# Patient Record
Sex: FEMALE | Race: WHITE | Employment: OTHER | URBAN - NONMETROPOLITAN AREA
[De-identification: names, ages, dates, MRNs, and addresses within clinical notes are randomized per-mention and may not be internally consistent; named-entity substitution may affect disease eponyms.]

---

## 2023-10-18 NOTE — PROGRESS NOTES
Dr Sonali Easton. 79 yo with RLL pneumonia, several weeks of weakness, cough, no energy, SOB. Negative covid and flu. Rocephin and levofloxacin given. No home O2, requiring O2 2L. CLARENCE, dehydration, bun creat 46/4.7. 1L fluids given. Troponin 40, K+ wnl, wbc 10, EKG sinus.

## 2023-10-19 ENCOUNTER — HOSPITAL ENCOUNTER (INPATIENT)
Age: 83
LOS: 5 days | Discharge: HOME | DRG: 682 | End: 2023-10-24
Attending: STUDENT IN AN ORGANIZED HEALTH CARE EDUCATION/TRAINING PROGRAM | Admitting: INTERNAL MEDICINE
Payer: MEDICARE

## 2023-10-19 ENCOUNTER — APPOINTMENT (OUTPATIENT)
Dept: GENERAL RADIOLOGY | Age: 83
DRG: 682 | End: 2023-10-19
Attending: STUDENT IN AN ORGANIZED HEALTH CARE EDUCATION/TRAINING PROGRAM
Payer: MEDICARE

## 2023-10-19 DIAGNOSIS — E87.6 HYPOKALEMIA: ICD-10-CM

## 2023-10-19 DIAGNOSIS — N17.9 AKI (ACUTE KIDNEY INJURY) (HCC): Primary | ICD-10-CM

## 2023-10-19 PROBLEM — J96.90 RESPIRATORY FAILURE (HCC): Status: ACTIVE | Noted: 2023-10-19

## 2023-10-19 LAB
ALBUMIN SERPL BCG-MCNC: 3.6 G/DL (ref 3.5–5.1)
ALP SERPL-CCNC: 234 U/L (ref 38–126)
ALT SERPL W/O P-5'-P-CCNC: 41 U/L (ref 11–66)
ANION GAP SERPL CALC-SCNC: 14 MEQ/L (ref 8–16)
AST SERPL-CCNC: 43 U/L (ref 5–40)
BILIRUB SERPL-MCNC: 0.4 MG/DL (ref 0.3–1.2)
BUN SERPL-MCNC: 48 MG/DL (ref 7–22)
CALCIUM SERPL-MCNC: 9.5 MG/DL (ref 8.5–10.5)
CHLORIDE SERPL-SCNC: 102 MEQ/L (ref 98–111)
CO2 SERPL-SCNC: 24 MEQ/L (ref 23–33)
CREAT SERPL-MCNC: 4.2 MG/DL (ref 0.4–1.2)
GFR SERPL CREATININE-BSD FRML MDRD: 10 ML/MIN/1.73M2
GLUCOSE SERPL-MCNC: 138 MG/DL (ref 70–108)
LACTATE SERPL-SCNC: 1 MMOL/L (ref 0.5–2)
POTASSIUM SERPL-SCNC: 3.4 MEQ/L (ref 3.5–5.2)
PROT SERPL-MCNC: 7.2 G/DL (ref 6.1–8)
SODIUM SERPL-SCNC: 140 MEQ/L (ref 135–145)

## 2023-10-19 PROCEDURE — 82550 ASSAY OF CK (CPK): CPT

## 2023-10-19 PROCEDURE — 80053 COMPREHEN METABOLIC PANEL: CPT

## 2023-10-19 PROCEDURE — 1200000003 HC TELEMETRY R&B

## 2023-10-19 PROCEDURE — 84550 ASSAY OF BLOOD/URIC ACID: CPT

## 2023-10-19 PROCEDURE — 51701 INSERT BLADDER CATHETER: CPT

## 2023-10-19 PROCEDURE — 51798 US URINE CAPACITY MEASURE: CPT

## 2023-10-19 PROCEDURE — 2580000003 HC RX 258: Performed by: PHYSICIAN ASSISTANT

## 2023-10-19 PROCEDURE — 36415 COLL VENOUS BLD VENIPUNCTURE: CPT

## 2023-10-19 PROCEDURE — 71045 X-RAY EXAM CHEST 1 VIEW: CPT

## 2023-10-19 PROCEDURE — 83735 ASSAY OF MAGNESIUM: CPT

## 2023-10-19 PROCEDURE — 83605 ASSAY OF LACTIC ACID: CPT

## 2023-10-19 PROCEDURE — 99222 1ST HOSP IP/OBS MODERATE 55: CPT | Performed by: PHYSICIAN ASSISTANT

## 2023-10-19 RX ORDER — LOSARTAN POTASSIUM 25 MG/1
12.5 TABLET ORAL DAILY
Status: DISCONTINUED | OUTPATIENT
Start: 2023-10-20 | End: 2023-10-24 | Stop reason: HOSPADM

## 2023-10-19 RX ORDER — ACETAMINOPHEN 325 MG/1
650 TABLET ORAL EVERY 6 HOURS PRN
Status: DISCONTINUED | OUTPATIENT
Start: 2023-10-19 | End: 2023-10-24 | Stop reason: HOSPADM

## 2023-10-19 RX ORDER — ACETAMINOPHEN 650 MG/1
650 SUPPOSITORY RECTAL EVERY 6 HOURS PRN
Status: DISCONTINUED | OUTPATIENT
Start: 2023-10-19 | End: 2023-10-24 | Stop reason: HOSPADM

## 2023-10-19 RX ORDER — SODIUM CHLORIDE 0.9 % (FLUSH) 0.9 %
10 SYRINGE (ML) INJECTION EVERY 12 HOURS SCHEDULED
Status: DISCONTINUED | OUTPATIENT
Start: 2023-10-19 | End: 2023-10-24 | Stop reason: HOSPADM

## 2023-10-19 RX ORDER — ONDANSETRON 2 MG/ML
4 INJECTION INTRAMUSCULAR; INTRAVENOUS EVERY 6 HOURS PRN
Status: DISCONTINUED | OUTPATIENT
Start: 2023-10-19 | End: 2023-10-24 | Stop reason: HOSPADM

## 2023-10-19 RX ORDER — AMLODIPINE BESYLATE 2.5 MG/1
2.5 TABLET ORAL DAILY
Status: DISCONTINUED | OUTPATIENT
Start: 2023-10-20 | End: 2023-10-24 | Stop reason: HOSPADM

## 2023-10-19 RX ORDER — SODIUM CHLORIDE 0.9 % (FLUSH) 0.9 %
10 SYRINGE (ML) INJECTION PRN
Status: DISCONTINUED | OUTPATIENT
Start: 2023-10-19 | End: 2023-10-24 | Stop reason: HOSPADM

## 2023-10-19 RX ORDER — ENOXAPARIN SODIUM 100 MG/ML
30 INJECTION SUBCUTANEOUS DAILY
Status: DISCONTINUED | OUTPATIENT
Start: 2023-10-20 | End: 2023-10-20

## 2023-10-19 RX ORDER — ONDANSETRON 4 MG/1
4 TABLET, ORALLY DISINTEGRATING ORAL EVERY 8 HOURS PRN
Status: DISCONTINUED | OUTPATIENT
Start: 2023-10-19 | End: 2023-10-24 | Stop reason: HOSPADM

## 2023-10-19 RX ORDER — ENOXAPARIN SODIUM 100 MG/ML
40 INJECTION SUBCUTANEOUS DAILY
Status: DISCONTINUED | OUTPATIENT
Start: 2023-10-20 | End: 2023-10-19

## 2023-10-19 RX ORDER — SODIUM CHLORIDE, SODIUM LACTATE, POTASSIUM CHLORIDE, AND CALCIUM CHLORIDE .6; .31; .03; .02 G/100ML; G/100ML; G/100ML; G/100ML
1000 INJECTION, SOLUTION INTRAVENOUS ONCE
Status: COMPLETED | OUTPATIENT
Start: 2023-10-19 | End: 2023-10-20

## 2023-10-19 RX ORDER — ASPIRIN 81 MG/1
81 TABLET, CHEWABLE ORAL DAILY
Status: DISCONTINUED | OUTPATIENT
Start: 2023-10-20 | End: 2023-10-24 | Stop reason: HOSPADM

## 2023-10-19 RX ORDER — SODIUM CHLORIDE, SODIUM LACTATE, POTASSIUM CHLORIDE, CALCIUM CHLORIDE 600; 310; 30; 20 MG/100ML; MG/100ML; MG/100ML; MG/100ML
INJECTION, SOLUTION INTRAVENOUS CONTINUOUS
Status: DISCONTINUED | OUTPATIENT
Start: 2023-10-19 | End: 2023-10-24

## 2023-10-19 RX ORDER — SODIUM CHLORIDE 9 MG/ML
INJECTION, SOLUTION INTRAVENOUS PRN
Status: DISCONTINUED | OUTPATIENT
Start: 2023-10-19 | End: 2023-10-24 | Stop reason: HOSPADM

## 2023-10-19 RX ORDER — POLYETHYLENE GLYCOL 3350 17 G/17G
17 POWDER, FOR SOLUTION ORAL DAILY PRN
Status: DISCONTINUED | OUTPATIENT
Start: 2023-10-19 | End: 2023-10-24 | Stop reason: HOSPADM

## 2023-10-19 RX ADMIN — SODIUM CHLORIDE, POTASSIUM CHLORIDE, SODIUM LACTATE AND CALCIUM CHLORIDE 1000 ML: 600; 310; 30; 20 INJECTION, SOLUTION INTRAVENOUS at 22:45

## 2023-10-19 RX ADMIN — SODIUM CHLORIDE, POTASSIUM CHLORIDE, SODIUM LACTATE AND CALCIUM CHLORIDE: 600; 310; 30; 20 INJECTION, SOLUTION INTRAVENOUS at 22:02

## 2023-10-20 ENCOUNTER — APPOINTMENT (OUTPATIENT)
Dept: ULTRASOUND IMAGING | Age: 83
DRG: 682 | End: 2023-10-20
Attending: STUDENT IN AN ORGANIZED HEALTH CARE EDUCATION/TRAINING PROGRAM
Payer: MEDICARE

## 2023-10-20 ENCOUNTER — APPOINTMENT (OUTPATIENT)
Dept: CT IMAGING | Age: 83
DRG: 682 | End: 2023-10-20
Attending: STUDENT IN AN ORGANIZED HEALTH CARE EDUCATION/TRAINING PROGRAM
Payer: MEDICARE

## 2023-10-20 LAB
ANION GAP SERPL CALC-SCNC: 14 MEQ/L (ref 8–16)
BACTERIA: NORMAL
BASOPHILS ABSOLUTE: 0.1 THOU/MM3 (ref 0–0.1)
BASOPHILS NFR BLD AUTO: 0.5 %
BILIRUB UR QL STRIP: NEGATIVE
BUN SERPL-MCNC: 42 MG/DL (ref 7–22)
CALCIUM SERPL-MCNC: 9.2 MG/DL (ref 8.5–10.5)
CASTS #/AREA URNS LPF: NORMAL /LPF
CASTS #/AREA URNS LPF: NORMAL /LPF
CHARACTER UR: CLEAR
CHARCOAL URNS QL MICRO: NORMAL
CHLORIDE SERPL-SCNC: 104 MEQ/L (ref 98–111)
CK SERPL-CCNC: 67 U/L (ref 30–135)
CO2 SERPL-SCNC: 24 MEQ/L (ref 23–33)
COLOR UR: YELLOW
CREAT SERPL-MCNC: 3.9 MG/DL (ref 0.4–1.2)
CRYSTALS URNS QL MICRO: NORMAL
DEPRECATED RDW RBC AUTO: 43.1 FL (ref 35–45)
EOSINOPHIL NFR BLD AUTO: 1.8 %
EOSINOPHILS ABSOLUTE: 0.2 THOU/MM3 (ref 0–0.4)
EPITHELIAL CELLS, UA: NORMAL /HPF
ERYTHROCYTE [DISTWIDTH] IN BLOOD BY AUTOMATED COUNT: 12.6 % (ref 11.5–14.5)
GFR SERPL CREATININE-BSD FRML MDRD: 11 ML/MIN/1.73M2
GLUCOSE SERPL-MCNC: 146 MG/DL (ref 70–108)
GLUCOSE UR QL STRIP.AUTO: NEGATIVE MG/DL
HCT VFR BLD AUTO: 30.8 % (ref 37–47)
HGB BLD-MCNC: 9.9 GM/DL (ref 12–16)
HGB UR QL STRIP.AUTO: NEGATIVE
IMM GRANULOCYTES # BLD AUTO: 0.05 THOU/MM3 (ref 0–0.07)
IMM GRANULOCYTES NFR BLD AUTO: 0.5 %
KETONES UR QL STRIP.AUTO: NEGATIVE
LEUKOCYTE ESTERASE UR QL STRIP.AUTO: NEGATIVE
LYMPHOCYTES ABSOLUTE: 1.4 THOU/MM3 (ref 1–4.8)
LYMPHOCYTES NFR BLD AUTO: 13.9 %
MAGNESIUM SERPL-MCNC: 1.7 MG/DL (ref 1.6–2.4)
MCH RBC QN AUTO: 29.6 PG (ref 26–33)
MCHC RBC AUTO-ENTMCNC: 32.1 GM/DL (ref 32.2–35.5)
MCV RBC AUTO: 92.2 FL (ref 81–99)
MONOCYTES ABSOLUTE: 0.7 THOU/MM3 (ref 0.4–1.3)
MONOCYTES NFR BLD AUTO: 7.3 %
NEUTROPHILS NFR BLD AUTO: 76 %
NITRITE UR QL STRIP.AUTO: NEGATIVE
NRBC BLD AUTO-RTO: 0 /100 WBC
PH UR STRIP.AUTO: 5 [PH] (ref 5–9)
PLATELET # BLD AUTO: 407 THOU/MM3 (ref 130–400)
PMV BLD AUTO: 9.2 FL (ref 9.4–12.4)
POTASSIUM SERPL-SCNC: 3.9 MEQ/L (ref 3.5–5.2)
PROCALCITONIN SERPL IA-MCNC: 0.23 NG/ML (ref 0.01–0.09)
PROT UR STRIP.AUTO-MCNC: NEGATIVE MG/DL
RBC # BLD AUTO: 3.34 MILL/MM3 (ref 4.2–5.4)
RBC #/AREA URNS HPF: NORMAL /HPF
RENAL EPI CELLS #/AREA URNS HPF: NORMAL /[HPF]
SEGMENTED NEUTROPHILS ABSOLUTE COUNT: 7.6 THOU/MM3 (ref 1.8–7.7)
SODIUM SERPL-SCNC: 142 MEQ/L (ref 135–145)
SPECIFIC GRAVITY UA: 1.01 (ref 1–1.03)
URATE SERPL-MCNC: 8.4 MG/DL (ref 2.4–5.7)
UROBILINOGEN, URINE: 0.2 EU/DL (ref 0–1)
WBC # BLD AUTO: 10 THOU/MM3 (ref 4.8–10.8)
WBC #/AREA URNS HPF: NORMAL /HPF
YEAST LIKE FUNGI URNS QL MICRO: NORMAL

## 2023-10-20 PROCEDURE — 84145 PROCALCITONIN (PCT): CPT

## 2023-10-20 PROCEDURE — 6370000000 HC RX 637 (ALT 250 FOR IP): Performed by: PHYSICIAN ASSISTANT

## 2023-10-20 PROCEDURE — 81001 URINALYSIS AUTO W/SCOPE: CPT

## 2023-10-20 PROCEDURE — 85025 COMPLETE CBC W/AUTO DIFF WBC: CPT

## 2023-10-20 PROCEDURE — 6360000002 HC RX W HCPCS: Performed by: PHYSICIAN ASSISTANT

## 2023-10-20 PROCEDURE — 1200000003 HC TELEMETRY R&B

## 2023-10-20 PROCEDURE — 6360000002 HC RX W HCPCS: Performed by: NURSE PRACTITIONER

## 2023-10-20 PROCEDURE — 2580000003 HC RX 258: Performed by: NURSE PRACTITIONER

## 2023-10-20 PROCEDURE — 76770 US EXAM ABDO BACK WALL COMP: CPT

## 2023-10-20 PROCEDURE — 99233 SBSQ HOSP IP/OBS HIGH 50: CPT | Performed by: NURSE PRACTITIONER

## 2023-10-20 PROCEDURE — 80048 BASIC METABOLIC PNL TOTAL CA: CPT

## 2023-10-20 PROCEDURE — 94669 MECHANICAL CHEST WALL OSCILL: CPT

## 2023-10-20 PROCEDURE — 71250 CT THORAX DX C-: CPT

## 2023-10-20 PROCEDURE — 86160 COMPLEMENT ANTIGEN: CPT

## 2023-10-20 PROCEDURE — 99222 1ST HOSP IP/OBS MODERATE 55: CPT | Performed by: INTERNAL MEDICINE

## 2023-10-20 PROCEDURE — 82610 CYSTATIN C: CPT

## 2023-10-20 PROCEDURE — 2580000003 HC RX 258: Performed by: INTERNAL MEDICINE

## 2023-10-20 PROCEDURE — 36415 COLL VENOUS BLD VENIPUNCTURE: CPT

## 2023-10-20 RX ORDER — HEPARIN SODIUM 5000 [USP'U]/ML
5000 INJECTION, SOLUTION INTRAVENOUS; SUBCUTANEOUS EVERY 8 HOURS SCHEDULED
Status: DISCONTINUED | OUTPATIENT
Start: 2023-10-20 | End: 2023-10-24 | Stop reason: HOSPADM

## 2023-10-20 RX ADMIN — HEPARIN SODIUM 5000 UNITS: 5000 INJECTION INTRAVENOUS; SUBCUTANEOUS at 01:21

## 2023-10-20 RX ADMIN — CEFTRIAXONE SODIUM 1000 MG: 1 INJECTION, POWDER, FOR SOLUTION INTRAMUSCULAR; INTRAVENOUS at 10:50

## 2023-10-20 RX ADMIN — HEPARIN SODIUM 5000 UNITS: 5000 INJECTION INTRAVENOUS; SUBCUTANEOUS at 06:16

## 2023-10-20 RX ADMIN — HEPARIN SODIUM 5000 UNITS: 5000 INJECTION INTRAVENOUS; SUBCUTANEOUS at 15:35

## 2023-10-20 RX ADMIN — HEPARIN SODIUM 5000 UNITS: 5000 INJECTION INTRAVENOUS; SUBCUTANEOUS at 22:19

## 2023-10-20 RX ADMIN — AMLODIPINE BESYLATE 2.5 MG: 2.5 TABLET ORAL at 10:16

## 2023-10-20 RX ADMIN — ASPIRIN 81 MG CHEWABLE TABLET 81 MG: 81 TABLET CHEWABLE at 10:16

## 2023-10-20 RX ADMIN — AZITHROMYCIN MONOHYDRATE 250 MG: 500 INJECTION, POWDER, LYOPHILIZED, FOR SOLUTION INTRAVENOUS at 10:53

## 2023-10-20 RX ADMIN — ONDANSETRON 4 MG: 4 TABLET, ORALLY DISINTEGRATING ORAL at 10:16

## 2023-10-20 RX ADMIN — SODIUM CHLORIDE, POTASSIUM CHLORIDE, SODIUM LACTATE AND CALCIUM CHLORIDE: 600; 310; 30; 20 INJECTION, SOLUTION INTRAVENOUS at 17:32

## 2023-10-20 NOTE — CARE COORDINATION
Case Management Assessment  Initial Evaluation    Date/Time of Evaluation: 10/20/2023 12:17 PM  Assessment Completed by: Shoshana Moura RN    If patient is discharged prior to next notation, then this note serves as note for discharge by case management. Patient Name: Carlos Díaz                   YOB: 1940  Diagnosis: Respiratory failure [facility]) [J96.90]                   Date / Time: 10/19/2023  9:15 PM  Location: [address] [place] [address]     Patient Admission Status: Inpatient   Readmission Risk Low 0-14, Mod 15-19), High > 20: Readmission Risk Score: 12.7    Current PCP: Shiva Stewart MD  PCP verified by CM? Yes    Chart Reviewed: Yes      History Provided by: Patient  Patient Orientation: Alert and Oriented    Patient Cognition: Alert    Hospitalization in the last 30 days (Readmission):  No    If yes, Readmission Assessment in CM Navigator will be completed. Advance Directives:      Code Status: Full Code   Patient's Primary Decision Maker is: Legal Next of Kin      Discharge Planning:    Patient lives with: Alone Type of Home: Independent Living  Primary Care Giver: Self  Patient Support Systems include: Children   Current Financial resources: Medicare  Current community resources: None  Current services prior to admission: None            Current DME:              Type of Home Care services:  None    ADLS  Prior functional level: Independent in ADLs/IADLs  Current functional level: Independent in ADLs/IADLs    Family can provide assistance at DC: Yes  Would you like Case Management to discuss the discharge plan with any other family members/significant others, and if so, who? No  Plans to Return to Present Housing: Yes  Other Identified Issues/Barriers to RETURNING to current housing: No  Potential Assistance needed at discharge: N/A            Potential DME:    Patient expects to discharge to:  Independent living facility  Plan for transportation at discharge: Family    Financial    Payor:

## 2023-10-20 NOTE — H&P
Hospitalist History and Physical          Patient: Nalini Kaye  : 1940  MRN: [id number]     Acct: [de-identified]    PCP: Marsha Marquis  Date of Admission: 10/19/2023  Date of Service: Pt seen/examined on 10/19/23  and Admitted to Inpatient with expected LOS less than two midnights due to medical therapy. Assessment and Plan:    Stage III CLARENCE on CKD   Creatinine 4.2 - was 0.6 in   Strict I and O's - help determine if oliguric   Bladder scan showed 400 mL after arrival  BUN:creatinine of 11:1   Nephrology consult   Check CK, C3&C4, uric acid and cystatin C levels   Holding ACEi and avoid nephrotoxic agents    Urinary retention  Endorses difficulty and pressure in bladder  Found to have 400+ on bladder and was straight cath prior to arrival  Espino catheter ordered  CT abd/pelvis ordered to evaluate for hydronephrosis and any signs of obstructive uropathy       Primary hypertension  Hold ACEi, resume amlodipine       Hypokalemia   3.3 on arrival  Deferring initial replacement due to kidney dysfunction and danger of potassium retention   Monitor with repeat labs    Hx of PE  Noted in hx - heparin DVT/PE prophylaxis     =======================================================================      Chief Complaint:  fatigue    History Of Present Illness:  Nlaini Kaye is a 80 y.o. female with Pwho presents to [address] [facility] [address] with fatigue, URI symptoms and transient hypoxia from [facility]. Patient has been experiencing fatigue for the past two weeks. She states in the past two weeks she has lost ten pounds and has not eaten or drank much. She had been having productive cough but has appeared to resolve. She was found to have a very elevated creatinine which had prompted her to be transferred to [facility]. On assessment she appeared well and endorsed feeling well.  She does endorse having trouble with urination despite feeling like she has to go and has had some ongoing urinary \"TROPONINI\" in the last 72 hours. No results found for: \"NITRU\", \"WBCUA\", \"BACTERIA\", \"RBCUA\", \"BLOODU\", \"SPECGRAV\", \"GLUCOSEU\"      Radiology:     No orders to display                  Diet: No diet orders on file  DVT prophylaxis: heparin  Code Status: Prior  Disposition: admit to 8B     Thank you Kamari Zhang for the opportunity to be involved in this patient's care.     Electronically signed by MARCIAL Leiva on 10/19/2023 at 9:25 PM.

## 2023-10-20 NOTE — PROGRESS NOTES
Comprehensive Nutrition Assessment    Type and Reason for Visit:  Initial, Positive Nutrition Screen (Weight loss and decreased appetite)    Nutrition Recommendations/Plan:   Continue regular diet to encourage PO intake at best efforts  Start ONS: Ensure Plus - BID, strawberry flavor  Will monitor need for additional nutrition interventions. Malnutrition Assessment:  Malnutrition Status: At risk for malnutrition (Comment) (10/20/23 1050)    Context:  Acute Illness     Findings of the 6 clinical characteristics of malnutrition:  Energy Intake:  75% or less of estimated energy requirements for 7 or more days  Weight Loss:  No significant weight loss     Body Fat Loss:  No significant body fat loss     Muscle Mass Loss:  No significant muscle mass loss    Fluid Accumulation:  No significant fluid accumulation     Strength:  Not Performed    Nutrition Assessment:     Pt. nutritionally compromised AEB poor appetite and PO intake PTA for about 3-4 weeks. At risk for further nutrition compromise r/t CLARENCE, advanced age and underlying medical condition (PMHx: HTN, pulmonary emboli, s/p cholecystectomy, C- section, laminotomy 2015). Nutrition Related Findings:    Pt. Report/Treatments/Miscellaneous: Pt seen, not wanting to eat much of breakfast, RN going to make her toast to eat as breakfast tray was \"too heavy\" food wise for her. Pt states that she hasn't had much of an appetite for 3-4 weeks now and reports a UBW of 152# - no significant wt changes. Pt states she is just not hungry and cannot give a reasoning behind changes. Pt says she feels \"fine. \" Discussed PO intake and ONS use - pt agreed to ensure strawberry flavor BID.    GI Status: BM -10/19  Pertinent Labs: BUN 42, Creatinine 3.9, Glucose 146  Pertinent Meds: azithromycin, heparin, lactated ringers     Wound Type: None       Current Nutrition Intake & Therapies:    Average Meal Intake: Unable to assess (No PO intakes documented, pt not wanting

## 2023-10-20 NOTE — PROGRESS NOTES
Hospitalist Progress Note    Patient:  Caprice Flores      Unit/Bed:8B-33/033-A    YOB: 1940    MRN: [id number]       Acct: [de-identified]     PCP: Dev Slater    Date of Admission: 10/19/2023    Assessment/Plan:    Emilee Cote secondary to #2, #3; patient lives at independent living at [facility]  CLARENCE on CKD stage V--Cozaar on hold; nephrology has been consulted; renal ultrasound has been ordered; avoid nephrotoxic agents, will have pharmacy dose all medications for GFR; currently on lactated Ringer's at 75 mL an hour  Pneumonia, right base (POA) possibly secondary to gram-negative bacilli--checked procalcitonin reading of 0.23, check CT chest without contrast to evaluate further; afebrile; will add Rocephin 10/20, Zithromax 10/20; add incentive spirometry and Acapella  Urinary retention--urinalysis clear  Primary hypertension, uncontrolled--on Norvasc, Cozaar on hold with #2; monitor  Hypokalemia--resolved with replacement, monitor closely  Elevated alkaline phosphatase  History of PE in 2012--not on any home anticoagulation      Expected discharge date: Pending clinical course    Disposition:    [x] Home       [] TCU       [] Rehab       [] Psych       [] SNF       [] [address]       [] Other-    Chief Complaint: Weakness, fatigue    Hospital Course: Per H&P done 10/19/2023: Caprice Flores is a 80 y.o. female with Pwho presents to [facility] with fatigue, URI symptoms and transient hypoxia from [facility]. Patient has been experiencing fatigue for the past two weeks. She states in the past two weeks she has lost ten pounds and has not eaten or drank much. She had been having productive cough but has appeared to resolve. She was found to have a very elevated creatinine which had prompted her to be transferred to [facility]. On assessment she appeared well and endorsed feeling well.  She does endorse having trouble with urination despite feeling like auscultation except diminished in the right base, bilaterally without Rales/Wheezes/Rhonchi. Cardiovascular: Regular rate and rhythm with normal S1/S2 without murmurs, rubs or gallops. Abdomen: Soft, non-tender, non-distended with normal bowel sounds. Musculoskeletal: passive and active ROM x 4 extremities. Skin: Skin color, texture, turgor normal.    Neurologic:  Neurovascularly intact without any focal sensory/motor deficits. Cranial nerves: II-XII intact, grossly non-focal.  Psychiatric: Alert and oriented, thought content appropriate  Capillary Refill: Brisk,< 3 seconds   Peripheral Pulses: +2 palpable, equal bilaterally       Labs:   No results for input(s): \"WBC\", \"HGB\", \"HCT\", \"PLT\" in the last 72 hours. Recent Labs     10/19/23  2145 10/20/23  0618    142   K 3.4* 3.9    104   CO2 24 24   BUN 48* 42*   CREATININE 4.2* 3.9*   CALCIUM 9.5 9.2     Recent Labs     10/19/23  2145   AST 43*   ALT 41   BILITOT 0.4   ALKPHOS 234*       Recent Labs     10/19/23  2145   CKTOTAL 67       Microbiology:    None    Urinalysis:      Lab Results   Component Value Date/Time    NITRU NEGATIVE 10/20/2023 12:50 AM    WBCUA 0-2 10/20/2023 12:50 AM    BACTERIA NONE SEEN 10/20/2023 12:50 AM    RBCUA NONE 10/20/2023 12:50 AM    BLOODU NEGATIVE 10/20/2023 12:50 AM    SPECGRAV 1.008 10/20/2023 12:50 AM       Radiology:  XR CHEST PORTABLE    Result Date: 10/20/2023  CHEST X-RAY FRONTAL VIEW COMPARISON: None. FINDINGS: A single frontal view of the chest was performed. Dextrocurvature of the thoracic spine is noted. The cardiac silhouette is accentuated by the portable technique. There is calcification within the thoracic aortic arch. A hazy right basilar infiltrate is suspected. No pleural effusion or pneumothorax. 1. A hazy right basilar infiltrate is suspected. CT chest can be considered for a more accurate assessment.  This document has been electronically signed by: Maria Fernanda Garrett M.D. on 10/20/2023 12:06

## 2023-10-20 NOTE — PROGRESS NOTES
Pharmacy Consult    Eugenia Cross is a 80 y.o. female for whom pharmacy has been consulted to renally dose all medications for GFR. 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 Assessment/Plan:  Inpatient medications:  No medications require renal adjustments at this time   Recommend keeping losartan on hold    Outpatient medications  Norco 7.5mg q6h --> recommend change to 5mg q6h upon discharge  Oxycodone 10mg q6h --> recommend change to 5mg q8h upon discharge  Patient had recent fill history (90 day supply on 8/17/23) for several medications that was not on their home med list. The recommendations for those medications are listed below:  Atorvastatin 40mg daily - no change  Levothyroxine 25mcg - no change  Metformin 1000mg BID - use is contraindicated  Losartan 50/HCTZ 12.5mg - HCTZ use is not recommended due to lack of efficacy    Thank you for the consult. Will continue to follow.     Teri Prader, PharmD   Pharmacy Resident  10/20/2023 10:49 AM

## 2023-10-20 NOTE — CONSULTS
[facility]    [address] [facility], [address] [place] [address],[address]  [address] [facility] [address]  10/20/2023 11:25 AM    Pt Name:    Raymundo Rea  MRN:     [id number]   [id number]  YOB: 1940  Admit Date:    10/19/2023  9:15 PM  Primary Care Physician:  Lenin Stone        Reason for Consult:  CLARENCE    History: The patient is a 80 y.o. with PMH of diabetes, HTN, who presented to the ED after 3 weeks of lethargy and malaise. She says that she began feeling tired about 3 weeks ago and has spent most of her time laying in bed. She has had poor oral intake. She also reports a productive cough with green sputum that started around the same time. On arrival at the ED her creatinine was 4.7. Her baseline creatinine was 0.9 in 2023. The patient also reports a history of chronic diarrhea and says that she has not told her doctor about it. She did not appear to be in any acute distress when I spoke to her but was hesitant to eat her breakfast.    Past Medical History:  Past Medical History:   Diagnosis Date    Herniated cervical disc without myelopathy 2015    Left cervical 6-7 disc herniation with left C6-7 neuroforaminal and lateral recess stenosis; with radiculopathy on left; without myelopathy. Hypertension     Pulmonary emboli ([address])        Past Surgical History:  Past Surgical History:   Procedure Laterality Date     SECTION      CHOLECYSTECTOMY      LAMINOTOMY  2015    posterior cervical laminotomy C6/7, formaintomy and microdiskectomy       Family History:  Family History   Problem Relation Age of Onset    Cancer Mother         brain tumor    High Blood Pressure Mother     Heart Disease Father         MI    Stroke Father     Heart Disease Paternal Grandmother         MI    Diabetes Neg Hx        Social History:  Social History     Socioeconomic History    Marital status:       Spouse name: Not on file    Number of children: Not on file    Years

## 2023-10-21 ENCOUNTER — APPOINTMENT (OUTPATIENT)
Dept: GENERAL RADIOLOGY | Age: 83
DRG: 682 | End: 2023-10-21
Attending: STUDENT IN AN ORGANIZED HEALTH CARE EDUCATION/TRAINING PROGRAM
Payer: MEDICARE

## 2023-10-21 LAB
ANION GAP SERPL CALC-SCNC: 13 MEQ/L (ref 8–16)
BUN SERPL-MCNC: 38 MG/DL (ref 7–22)
C3C SERPL-MCNC: 208 MG/DL (ref 90–180)
C4 SERPL-MCNC: 13 MG/DL (ref 10–40)
CALCIUM SERPL-MCNC: 9 MG/DL (ref 8.5–10.5)
CHLORIDE SERPL-SCNC: 105 MEQ/L (ref 98–111)
CO2 SERPL-SCNC: 25 MEQ/L (ref 23–33)
CREAT SERPL-MCNC: 2.9 MG/DL (ref 0.4–1.2)
DEPRECATED RDW RBC AUTO: 43.5 FL (ref 35–45)
ERYTHROCYTE [DISTWIDTH] IN BLOOD BY AUTOMATED COUNT: 12.8 % (ref 11.5–14.5)
GFR SERPL CREATININE-BSD FRML MDRD: 16 ML/MIN/1.73M2
GLUCOSE BLD STRIP.AUTO-MCNC: 154 MG/DL (ref 70–108)
GLUCOSE BLD STRIP.AUTO-MCNC: 196 MG/DL (ref 70–108)
GLUCOSE BLD STRIP.AUTO-MCNC: 237 MG/DL (ref 70–108)
GLUCOSE SERPL-MCNC: 124 MG/DL (ref 70–108)
HCT VFR BLD AUTO: 30.1 % (ref 37–47)
HGB BLD-MCNC: 9.5 GM/DL (ref 12–16)
MAGNESIUM SERPL-MCNC: 1.4 MG/DL (ref 1.6–2.4)
MCH RBC QN AUTO: 37.4 PG (ref 26–33)
MCHC RBC AUTO-ENTMCNC: 37.8 GM/DL (ref 32.2–35.5)
MCV RBC AUTO: 98.8 FL (ref 81–99)
PLATELET # BLD AUTO: 343 THOU/MM3 (ref 130–400)
PMV BLD AUTO: 8.9 FL (ref 9.4–12.4)
POTASSIUM SERPL-SCNC: 3.4 MEQ/L (ref 3.5–5.2)
RBC # BLD AUTO: 2.54 MILL/MM3 (ref 4.2–5.4)
SODIUM SERPL-SCNC: 143 MEQ/L (ref 135–145)
WBC # BLD AUTO: 7.2 THOU/MM3 (ref 4.8–10.8)

## 2023-10-21 PROCEDURE — 6370000000 HC RX 637 (ALT 250 FOR IP): Performed by: INTERNAL MEDICINE

## 2023-10-21 PROCEDURE — 36415 COLL VENOUS BLD VENIPUNCTURE: CPT

## 2023-10-21 PROCEDURE — 6370000000 HC RX 637 (ALT 250 FOR IP): Performed by: PHYSICIAN ASSISTANT

## 2023-10-21 PROCEDURE — 99232 SBSQ HOSP IP/OBS MODERATE 35: CPT | Performed by: INTERNAL MEDICINE

## 2023-10-21 PROCEDURE — 82948 REAGENT STRIP/BLOOD GLUCOSE: CPT

## 2023-10-21 PROCEDURE — 6360000002 HC RX W HCPCS: Performed by: NURSE PRACTITIONER

## 2023-10-21 PROCEDURE — 94669 MECHANICAL CHEST WALL OSCILL: CPT

## 2023-10-21 PROCEDURE — 85027 COMPLETE CBC AUTOMATED: CPT

## 2023-10-21 PROCEDURE — 71045 X-RAY EXAM CHEST 1 VIEW: CPT

## 2023-10-21 PROCEDURE — 2500000003 HC RX 250 WO HCPCS: Performed by: INTERNAL MEDICINE

## 2023-10-21 PROCEDURE — 2580000003 HC RX 258: Performed by: INTERNAL MEDICINE

## 2023-10-21 PROCEDURE — 1200000003 HC TELEMETRY R&B

## 2023-10-21 PROCEDURE — 80048 BASIC METABOLIC PNL TOTAL CA: CPT

## 2023-10-21 PROCEDURE — 99233 SBSQ HOSP IP/OBS HIGH 50: CPT | Performed by: NURSE PRACTITIONER

## 2023-10-21 PROCEDURE — 2700000000 HC OXYGEN THERAPY PER DAY

## 2023-10-21 PROCEDURE — 6360000002 HC RX W HCPCS: Performed by: PHYSICIAN ASSISTANT

## 2023-10-21 PROCEDURE — 83735 ASSAY OF MAGNESIUM: CPT

## 2023-10-21 PROCEDURE — 2580000003 HC RX 258: Performed by: NURSE PRACTITIONER

## 2023-10-21 RX ORDER — INSULIN LISPRO 100 [IU]/ML
0-8 INJECTION, SOLUTION INTRAVENOUS; SUBCUTANEOUS
Status: DISCONTINUED | OUTPATIENT
Start: 2023-10-21 | End: 2023-10-24 | Stop reason: HOSPADM

## 2023-10-21 RX ORDER — INSULIN LISPRO 100 [IU]/ML
0-4 INJECTION, SOLUTION INTRAVENOUS; SUBCUTANEOUS NIGHTLY
Status: DISCONTINUED | OUTPATIENT
Start: 2023-10-21 | End: 2023-10-24 | Stop reason: HOSPADM

## 2023-10-21 RX ORDER — MAGNESIUM SULFATE IN WATER 40 MG/ML
2000 INJECTION, SOLUTION INTRAVENOUS PRN
Status: DISCONTINUED | OUTPATIENT
Start: 2023-10-21 | End: 2023-10-21

## 2023-10-21 RX ORDER — MAGNESIUM SULFATE HEPTAHYDRATE 40 MG/ML
2000 INJECTION, SOLUTION INTRAVENOUS ONCE
Status: COMPLETED | OUTPATIENT
Start: 2023-10-21 | End: 2023-10-21

## 2023-10-21 RX ORDER — POTASSIUM CHLORIDE 20 MEQ/1
40 TABLET, EXTENDED RELEASE ORAL ONCE
Status: COMPLETED | OUTPATIENT
Start: 2023-10-21 | End: 2023-10-21

## 2023-10-21 RX ADMIN — HEPARIN SODIUM 5000 UNITS: 5000 INJECTION INTRAVENOUS; SUBCUTANEOUS at 21:19

## 2023-10-21 RX ADMIN — CEFTRIAXONE SODIUM 1000 MG: 1 INJECTION, POWDER, FOR SOLUTION INTRAMUSCULAR; INTRAVENOUS at 09:24

## 2023-10-21 RX ADMIN — HEPARIN SODIUM 5000 UNITS: 5000 INJECTION INTRAVENOUS; SUBCUTANEOUS at 14:36

## 2023-10-21 RX ADMIN — SODIUM CHLORIDE, POTASSIUM CHLORIDE, SODIUM LACTATE AND CALCIUM CHLORIDE: 600; 310; 30; 20 INJECTION, SOLUTION INTRAVENOUS at 05:47

## 2023-10-21 RX ADMIN — POTASSIUM CHLORIDE 40 MEQ: 1500 TABLET, EXTENDED RELEASE ORAL at 12:32

## 2023-10-21 RX ADMIN — AZITHROMYCIN MONOHYDRATE 250 MG: 500 INJECTION, POWDER, LYOPHILIZED, FOR SOLUTION INTRAVENOUS at 09:24

## 2023-10-21 RX ADMIN — AMLODIPINE BESYLATE 2.5 MG: 2.5 TABLET ORAL at 09:26

## 2023-10-21 RX ADMIN — HEPARIN SODIUM 5000 UNITS: 5000 INJECTION INTRAVENOUS; SUBCUTANEOUS at 05:47

## 2023-10-21 RX ADMIN — ASPIRIN 81 MG CHEWABLE TABLET 81 MG: 81 TABLET CHEWABLE at 09:26

## 2023-10-21 RX ADMIN — MAGNESIUM SULFATE HEPTAHYDRATE 2000 MG: 40 INJECTION, SOLUTION INTRAVENOUS at 12:34

## 2023-10-21 NOTE — PLAN OF CARE
Problem: Discharge Planning  Goal: Discharge to home or other facility with appropriate resources  Outcome: Progressing  Flowsheets (Taken 10/20/2023 2014)  Discharge to home or other facility with appropriate resources:   Identify barriers to discharge with patient and caregiver   Arrange for needed discharge resources and transportation as appropriate   Identify discharge learning needs (meds, wound care, etc)   Refer to discharge planning if patient needs post-hospital services based on physician order or complex needs related to functional status, cognitive ability or social support system     Problem: Skin/Tissue Integrity  Goal: Absence of new skin breakdown  Description: 1. Monitor for areas of redness and/or skin breakdown  2. Assess vascular access sites hourly  3. Every 4-6 hours minimum:  Change oxygen saturation probe site  4. Every 4-6 hours:  If on nasal continuous positive airway pressure, respiratory therapy assess nares and determine need for appliance change or resting period.   Outcome: Progressing     Problem: Safety - Adult  Goal: Free from fall injury  Outcome: Progressing     Problem: ABCDS Injury Assessment  Goal: Absence of physical injury  Outcome: Progressing     Problem: Nutrition Deficit:  Goal: Optimize nutritional status  Outcome: Progressing     Problem: Respiratory - Adult  Goal: Achieves optimal ventilation and oxygenation  Outcome: Progressing     Problem: Musculoskeletal - Adult  Goal: Return mobility to safest level of function  Outcome: Progressing     Problem: Gastrointestinal - Adult  Goal: Minimal or absence of nausea and vomiting  Outcome: Progressing  Goal: Maintains or returns to baseline bowel function  Outcome: Progressing     Problem: Genitourinary - Adult  Goal: Absence of urinary retention  Outcome: Progressing  Goal: Urinary catheter remains patent  Outcome: Progressing     Problem: Metabolic/Fluid and Electrolytes - Adult  Goal: Electrolytes maintained within normal limits  Outcome: Progressing  Goal: Hemodynamic stability and optimal renal function maintained  Outcome: Progressing     Problem: Hematologic - Adult  Goal: Maintains hematologic stability  Outcome: Progressing

## 2023-10-21 NOTE — PROGRESS NOTES
ondansetron, polyethylene glycol, acetaminophen **OR** acetaminophen     Lab Data :  CBC:   Recent Labs     10/20/23  0618 10/21/23  0622   WBC 10.0 7.2   HGB 9.9* 9.5*   HCT 30.8* 30.1*   * 343     CMP:  Recent Labs     10/19/23  2145 10/20/23  0618 10/21/23  0622    142 143   K 3.4* 3.9 3.4*    104 105   CO2 24 24 25   BUN 48* 42* 38*   CREATININE 4.2* 3.9* 2.9*   GLUCOSE 138* 146* 124*   CALCIUM 9.5 9.2 9.0   MG 1.7  --  1.4*     Hepatic:   Recent Labs     10/19/23  2145   LABALBU 3.6   AST 43*   ALT 41   BILITOT 0.4   ALKPHOS 234*         Assessment and Plan: CLARENCE due to prerenal causes  Serum creatinine 4.22 days ago. Now down to 2.9  Continue with IV fluids  UA benign  Hypokalemia. Will replace  Hypomagnesemia. Will replace  Renal lesion. Will need evaluation once CLARENCE improves    D/W patient   Lui Reilly MD  [facility]    This report has been created using voice recognition software.  It may contain minor errors which are inherent in voice recognition technology

## 2023-10-22 PROBLEM — E87.6 HYPOKALEMIA: Status: ACTIVE | Noted: 2023-10-22

## 2023-10-22 PROBLEM — E83.42 HYPOMAGNESEMIA: Status: ACTIVE | Noted: 2023-10-22

## 2023-10-22 LAB
ANION GAP SERPL CALC-SCNC: 10 MEQ/L (ref 8–16)
BUN SERPL-MCNC: 32 MG/DL (ref 7–22)
CALCIUM SERPL-MCNC: 8.8 MG/DL (ref 8.5–10.5)
CHLORIDE SERPL-SCNC: 107 MEQ/L (ref 98–111)
CO2 SERPL-SCNC: 27 MEQ/L (ref 23–33)
CREAT SERPL-MCNC: 2.3 MG/DL (ref 0.4–1.2)
CYSTATIN C SERPL-MCNC: 2.7 MG/L (ref 0.5–1.2)
GFR SERPL CREATININE-BSD FRML MDRD: 21 ML/MIN/1.73M2
GLUCOSE BLD STRIP.AUTO-MCNC: 144 MG/DL (ref 70–108)
GLUCOSE BLD STRIP.AUTO-MCNC: 154 MG/DL (ref 70–108)
GLUCOSE BLD STRIP.AUTO-MCNC: 244 MG/DL (ref 70–108)
GLUCOSE BLD STRIP.AUTO-MCNC: 279 MG/DL (ref 70–108)
GLUCOSE SERPL-MCNC: 132 MG/DL (ref 70–108)
MAGNESIUM SERPL-MCNC: 1.8 MG/DL (ref 1.6–2.4)
POTASSIUM SERPL-SCNC: 3.6 MEQ/L (ref 3.5–5.2)
SODIUM SERPL-SCNC: 144 MEQ/L (ref 135–145)

## 2023-10-22 PROCEDURE — 80048 BASIC METABOLIC PNL TOTAL CA: CPT

## 2023-10-22 PROCEDURE — 6370000000 HC RX 637 (ALT 250 FOR IP): Performed by: PHYSICIAN ASSISTANT

## 2023-10-22 PROCEDURE — 83735 ASSAY OF MAGNESIUM: CPT

## 2023-10-22 PROCEDURE — 82948 REAGENT STRIP/BLOOD GLUCOSE: CPT

## 2023-10-22 PROCEDURE — 6360000002 HC RX W HCPCS: Performed by: NURSE PRACTITIONER

## 2023-10-22 PROCEDURE — 99232 SBSQ HOSP IP/OBS MODERATE 35: CPT | Performed by: INTERNAL MEDICINE

## 2023-10-22 PROCEDURE — 99233 SBSQ HOSP IP/OBS HIGH 50: CPT | Performed by: NURSE PRACTITIONER

## 2023-10-22 PROCEDURE — 6360000002 HC RX W HCPCS: Performed by: PHYSICIAN ASSISTANT

## 2023-10-22 PROCEDURE — 36415 COLL VENOUS BLD VENIPUNCTURE: CPT

## 2023-10-22 PROCEDURE — 1200000000 HC SEMI PRIVATE

## 2023-10-22 PROCEDURE — 2580000003 HC RX 258: Performed by: NURSE PRACTITIONER

## 2023-10-22 RX ORDER — AZITHROMYCIN 250 MG/1
250 TABLET, FILM COATED ORAL DAILY
Status: COMPLETED | OUTPATIENT
Start: 2023-10-23 | End: 2023-10-24

## 2023-10-22 RX ADMIN — HEPARIN SODIUM 5000 UNITS: 5000 INJECTION INTRAVENOUS; SUBCUTANEOUS at 14:01

## 2023-10-22 RX ADMIN — HEPARIN SODIUM 5000 UNITS: 5000 INJECTION INTRAVENOUS; SUBCUTANEOUS at 06:29

## 2023-10-22 RX ADMIN — AMLODIPINE BESYLATE 2.5 MG: 2.5 TABLET ORAL at 07:51

## 2023-10-22 RX ADMIN — ASPIRIN 81 MG CHEWABLE TABLET 81 MG: 81 TABLET CHEWABLE at 07:50

## 2023-10-22 RX ADMIN — AZITHROMYCIN MONOHYDRATE 250 MG: 500 INJECTION, POWDER, LYOPHILIZED, FOR SOLUTION INTRAVENOUS at 07:55

## 2023-10-22 RX ADMIN — CEFTRIAXONE SODIUM 1000 MG: 1 INJECTION, POWDER, FOR SOLUTION INTRAMUSCULAR; INTRAVENOUS at 08:50

## 2023-10-22 NOTE — PROGRESS NOTES
[address] [facility] 250 Pharmacy Adult Intravenous to Oral Protocol    Azithromycin changed to PO per [address] [facility] 250 P&T IV to PO protocol. Patient meets criteria based on the following:   Tolerating diet more advanced than clear liquids: Yes  Tolerating other oral medications: Yes  Not on vasopressors: Yes  No nausea/vomiting within past 24 hours: Yes  No active GI bleed:  Yes  No gastrectomy, gastric outlet or bowel obstruction, ileus, malabsorption syndrome: Yes    Antibiotics only:  Received IV antibiotic for at least 24 hours: Yes  Infection is not meningitis, endocarditis, osteomyelitis, or pancreatitis: Yes  Afebrile for 24 hours: Yes  No clinical deteriorating/instability: Yes    Thank you,  Avery Adams, PharmD, BCPS   10/22/2023  11:50 AM

## 2023-10-22 NOTE — PROGRESS NOTES
Hospitalist Progress Note    Patient:  Lesly Alexander      Unit/Bed:8B-33/033-A    YOB: 1940    MRN: [id number]       Acct: [de-identified]     PCP: Alen Hdez MD    Date of Admission: 10/19/2023    Assessment/Plan:    Marcy Lala secondary to #2, #3; patient lives at independent living at [facility]  CLARENCE on CKD stage V--creatinine down to 2.3, Cozaar on hold; appreciate nephrology input; renal ultrasound noted; avoid nephrotoxic agents, pharmacy dosing all medications for GFR; currently on lactated Ringer's at 75 mL an hour  Pneumonia, right base (POA) possibly secondary to gram-negative bacilli--checked procalcitonin reading of 0.23, CT chest without contrast showed diffuse COPD with superimposed right lower lobe infiltrate; afebrile; Rocephin 10/20, Zithromax 10/20; incentive spirometry and Acapella  Urinary retention--urinalysis clear; Espino catheter placed on admission  Hypomagnesia--resolved  Hypokalemia--resolved  Primary hypertension, uncontrolled--on Norvasc, Cozaar on hold with #2; monitor  Diabetes mellitus type II, uncontrolled--was on Glucophage which was stopped, on medium dose sliding scale; ADA diet, monitor  Renal lesion--appreciate nephrology input, will need evaluation once CLARENCE improves per nephrology note dated 10/21  Normocytic anemia--stable, monitor  Calcified lymph node in the spleen  Hiatal hernia  Slightly heterogeneous thyroid gland  Elevated alkaline phosphatase  History of PE in 2012--not on any home anticoagulation      Expected discharge date: Pending clinical course    Disposition:    [x] Home       [] TCU       [] Rehab       [] Psych       [] SNF       [] [address]       [] Other-    Chief Complaint: Weakness, fatigue    Hospital Course: Per H&P done 10/19/2023: Lesly Alexander is a 80 y.o. female with Pwho presents to [address] with fatigue, URI symptoms and transient hypoxia from [facility].  Patient has been 10/22/23  0612    143 144   K 3.9 3.4* 3.6    105 107   CO2 24 25 27   BUN 42* 38* 32*   CREATININE 3.9* 2.9* 2.3*   CALCIUM 9.2 9.0 8.8       Recent Labs     10/19/23  2145   AST 43*   ALT 41   BILITOT 0.4   ALKPHOS 234*         Recent Labs     10/19/23  2145   CKTOTAL 67         Microbiology:    None    Urinalysis:      Lab Results   Component Value Date/Time    NITRU NEGATIVE 10/20/2023 12:50 AM    WBCUA 0-2 10/20/2023 12:50 AM    BACTERIA NONE SEEN 10/20/2023 12:50 AM    RBCUA NONE 10/20/2023 12:50 AM    BLOODU NEGATIVE 10/20/2023 12:50 AM    SPECGRAV 1.008 10/20/2023 12:50 AM       Radiology:  XR CHEST PORTABLE    Result Date: 10/20/2023  CHEST X-RAY FRONTAL VIEW COMPARISON: None. FINDINGS: A single frontal view of the chest was performed. Dextrocurvature of the thoracic spine is noted. The cardiac silhouette is accentuated by the portable technique. There is calcification within the thoracic aortic arch. A hazy right basilar infiltrate is suspected. No pleural effusion or pneumothorax. 1. A hazy right basilar infiltrate is suspected. CT chest can be considered for a more accurate assessment.  This document has been electronically signed by: Higinio Cowart M.D. on 10/20/2023 12:06 AM      DVT prophylaxis: [] Lovenox                                 [] SCDs                                 [x] SQ Heparin                                 [] Encourage ambulation           [] Already on Anticoagulation     Code Status: Full Code    PT/OT Eval Status: Monitor    Tele:   [x] Yes sinus rhythm heart rate 80, did have 1 short burst of PSVT             [] no    Active Hospital Problems    Diagnosis Date Noted    CLARENCE (acute kidney injury) ([address]) [N17.9] 10/19/2023       Electronically signed by AUBREY Shelby CNP on 10/22/2023 at 7:49 AM

## 2023-10-23 LAB
ANION GAP SERPL CALC-SCNC: 9 MEQ/L (ref 8–16)
BUN SERPL-MCNC: 30 MG/DL (ref 7–22)
CALCIUM SERPL-MCNC: 9.1 MG/DL (ref 8.5–10.5)
CHLORIDE SERPL-SCNC: 106 MEQ/L (ref 98–111)
CO2 SERPL-SCNC: 29 MEQ/L (ref 23–33)
CREAT SERPL-MCNC: 1.8 MG/DL (ref 0.4–1.2)
GFR SERPL CREATININE-BSD FRML MDRD: 28 ML/MIN/1.73M2
GLUCOSE BLD STRIP.AUTO-MCNC: 118 MG/DL (ref 70–108)
GLUCOSE BLD STRIP.AUTO-MCNC: 140 MG/DL (ref 70–108)
GLUCOSE BLD STRIP.AUTO-MCNC: 151 MG/DL (ref 70–108)
GLUCOSE BLD STRIP.AUTO-MCNC: 165 MG/DL (ref 70–108)
GLUCOSE SERPL-MCNC: 135 MG/DL (ref 70–108)
POTASSIUM SERPL-SCNC: 4.2 MEQ/L (ref 3.5–5.2)
SODIUM SERPL-SCNC: 144 MEQ/L (ref 135–145)

## 2023-10-23 PROCEDURE — 1200000000 HC SEMI PRIVATE

## 2023-10-23 PROCEDURE — 6370000000 HC RX 637 (ALT 250 FOR IP): Performed by: NURSE PRACTITIONER

## 2023-10-23 PROCEDURE — 6370000000 HC RX 637 (ALT 250 FOR IP)

## 2023-10-23 PROCEDURE — 6360000002 HC RX W HCPCS: Performed by: NURSE PRACTITIONER

## 2023-10-23 PROCEDURE — 2580000003 HC RX 258: Performed by: INTERNAL MEDICINE

## 2023-10-23 PROCEDURE — 36415 COLL VENOUS BLD VENIPUNCTURE: CPT

## 2023-10-23 PROCEDURE — 2580000003 HC RX 258: Performed by: NURSE PRACTITIONER

## 2023-10-23 PROCEDURE — 99232 SBSQ HOSP IP/OBS MODERATE 35: CPT | Performed by: INTERNAL MEDICINE

## 2023-10-23 PROCEDURE — 2700000000 HC OXYGEN THERAPY PER DAY

## 2023-10-23 PROCEDURE — 80048 BASIC METABOLIC PNL TOTAL CA: CPT

## 2023-10-23 PROCEDURE — 82948 REAGENT STRIP/BLOOD GLUCOSE: CPT

## 2023-10-23 PROCEDURE — 94640 AIRWAY INHALATION TREATMENT: CPT

## 2023-10-23 PROCEDURE — 6360000002 HC RX W HCPCS: Performed by: PHYSICIAN ASSISTANT

## 2023-10-23 PROCEDURE — 6370000000 HC RX 637 (ALT 250 FOR IP): Performed by: PHYSICIAN ASSISTANT

## 2023-10-23 RX ORDER — IPRATROPIUM BROMIDE AND ALBUTEROL SULFATE 2.5; .5 MG/3ML; MG/3ML
1 SOLUTION RESPIRATORY (INHALATION) EVERY 4 HOURS PRN
Status: DISCONTINUED | OUTPATIENT
Start: 2023-10-23 | End: 2023-10-24 | Stop reason: HOSPADM

## 2023-10-23 RX ADMIN — SODIUM CHLORIDE, POTASSIUM CHLORIDE, SODIUM LACTATE AND CALCIUM CHLORIDE: 600; 310; 30; 20 INJECTION, SOLUTION INTRAVENOUS at 04:19

## 2023-10-23 RX ADMIN — HEPARIN SODIUM 5000 UNITS: 5000 INJECTION INTRAVENOUS; SUBCUTANEOUS at 14:16

## 2023-10-23 RX ADMIN — HEPARIN SODIUM 5000 UNITS: 5000 INJECTION INTRAVENOUS; SUBCUTANEOUS at 21:19

## 2023-10-23 RX ADMIN — HEPARIN SODIUM 5000 UNITS: 5000 INJECTION INTRAVENOUS; SUBCUTANEOUS at 00:03

## 2023-10-23 RX ADMIN — AMLODIPINE BESYLATE 2.5 MG: 2.5 TABLET ORAL at 08:26

## 2023-10-23 RX ADMIN — IPRATROPIUM BROMIDE AND ALBUTEROL SULFATE 1 DOSE: .5; 3 SOLUTION RESPIRATORY (INHALATION) at 05:37

## 2023-10-23 RX ADMIN — SODIUM CHLORIDE, POTASSIUM CHLORIDE, SODIUM LACTATE AND CALCIUM CHLORIDE: 600; 310; 30; 20 INJECTION, SOLUTION INTRAVENOUS at 21:16

## 2023-10-23 RX ADMIN — ASPIRIN 81 MG CHEWABLE TABLET 81 MG: 81 TABLET CHEWABLE at 09:47

## 2023-10-23 RX ADMIN — HEPARIN SODIUM 5000 UNITS: 5000 INJECTION INTRAVENOUS; SUBCUTANEOUS at 05:53

## 2023-10-23 RX ADMIN — CEFTRIAXONE SODIUM 1000 MG: 1 INJECTION, POWDER, FOR SOLUTION INTRAMUSCULAR; INTRAVENOUS at 10:29

## 2023-10-23 RX ADMIN — AZITHROMYCIN DIHYDRATE 250 MG: 250 TABLET, FILM COATED ORAL at 09:47

## 2023-10-23 NOTE — PROGRESS NOTES
Hospitalist Progress Note    Patient:  Kunal Nava      Unit/Bed:8B-33/033-A    YOB: 1940    MRN: [id number]       Acct: [de-identified]     PCP: Stephanie Benavides MD    Date of Admission: 10/19/2023    Assessment/Plan:    Raheem Pool secondary to #2, #3; patient lives at independent living at [facility]  CLARENCE on CKD stage V--creatinine down to 1.8, Cozaar on hold; appreciate nephrology input; renal ultrasound noted; avoid nephrotoxic agents, pharmacy dosing all medications for GFR; currently on lactated Ringer's at 75 mL an hour~reduced to 60 mL an hour per nephrology, planning to remove Espino catheter today  Pneumonia, right base (POA) possibly secondary to gram-negative bacilli--checked procalcitonin reading of 0.23, CT chest without contrast showed diffuse COPD with superimposed right lower lobe infiltrate; afebrile; Rocephin 10/20, Zithromax 10/20-10/22; incentive spirometry and Acapella  Acute hypoxic respiratory failure--patient was requiring 2 L of oxygen and has been weaned down to 1L; likely secondary to #3, has incentive spirometry and Acapella  Urinary retention--urinalysis clear;  Espino catheter placed on admission  Hypomagnesia--resolved  Hypokalemia--resolved  Primary hypertension, uncontrolled--on Norvasc, Cozaar on hold with #2; monitor  Diabetes mellitus type II, uncontrolled--was on Glucophage which was stopped, on medium dose sliding scale; ADA diet, monitor  Renal lesion--appreciate nephrology input, will need evaluation once CLARENCE improves per nephrology note dated 10/21; can be done outpatient and will need CT renal mass protocol  Normocytic anemia--stable, monitor  Calcified lymph node in the spleen  Hiatal hernia  Slightly heterogeneous thyroid gland  Elevated alkaline phosphatase  History of PE in 2012--not on any home anticoagulation      Expected discharge date: Pending clinical course    Disposition:    [x] Home       [] TCU       [] Rehab       [] Psych

## 2023-10-23 NOTE — FLOWSHEET NOTE
10/22/23 2110   Oxygen Therapy   SpO2 93 %     Patient removed from nasal cannula to ambulate to bathroom, brushed her teeth, and ambulated back to bed. After patient was positioned in bed, saturation reassessed and found to be 93%. Patient refuses to leave oxygen off at this time stating, \"I'm not comfortable leaving it off at this time. \" Education given and RN offered to assess saturation more frequently however patient continues to request supplemental oxygen.

## 2023-10-24 VITALS
SYSTOLIC BLOOD PRESSURE: 142 MMHG | HEART RATE: 80 BPM | OXYGEN SATURATION: 94 % | TEMPERATURE: 98.4 F | RESPIRATION RATE: 18 BRPM | HEIGHT: 65 IN | DIASTOLIC BLOOD PRESSURE: 62 MMHG | BODY MASS INDEX: 27.14 KG/M2 | WEIGHT: 162.92 LBS

## 2023-10-24 LAB
ANION GAP SERPL CALC-SCNC: 16 MEQ/L (ref 8–16)
BUN SERPL-MCNC: 23 MG/DL (ref 7–22)
CALCIUM SERPL-MCNC: 9.3 MG/DL (ref 8.5–10.5)
CHLORIDE SERPL-SCNC: 106 MEQ/L (ref 98–111)
CO2 SERPL-SCNC: 23 MEQ/L (ref 23–33)
CREAT SERPL-MCNC: 1.5 MG/DL (ref 0.4–1.2)
GFR SERPL CREATININE-BSD FRML MDRD: 34 ML/MIN/1.73M2
GLUCOSE SERPL-MCNC: 126 MG/DL (ref 70–108)
POTASSIUM SERPL-SCNC: 4.2 MEQ/L (ref 3.5–5.2)
SODIUM SERPL-SCNC: 145 MEQ/L (ref 135–145)

## 2023-10-24 PROCEDURE — 6370000000 HC RX 637 (ALT 250 FOR IP): Performed by: NURSE PRACTITIONER

## 2023-10-24 PROCEDURE — 80048 BASIC METABOLIC PNL TOTAL CA: CPT

## 2023-10-24 PROCEDURE — 99232 SBSQ HOSP IP/OBS MODERATE 35: CPT | Performed by: INTERNAL MEDICINE

## 2023-10-24 PROCEDURE — 6370000000 HC RX 637 (ALT 250 FOR IP): Performed by: PHYSICIAN ASSISTANT

## 2023-10-24 PROCEDURE — 36415 COLL VENOUS BLD VENIPUNCTURE: CPT

## 2023-10-24 PROCEDURE — 2580000003 HC RX 258: Performed by: PHYSICIAN ASSISTANT

## 2023-10-24 PROCEDURE — 99239 HOSP IP/OBS DSCHRG MGMT >30: CPT | Performed by: NURSE PRACTITIONER

## 2023-10-24 PROCEDURE — 6360000002 HC RX W HCPCS: Performed by: PHYSICIAN ASSISTANT

## 2023-10-24 RX ORDER — CEFDINIR 300 MG/1
300 CAPSULE ORAL 2 TIMES DAILY
Qty: 8 CAPSULE | Refills: 0 | Status: SHIPPED | OUTPATIENT
Start: 2023-10-24 | End: 2023-10-24 | Stop reason: SDUPTHER

## 2023-10-24 RX ORDER — CEFDINIR 300 MG/1
300 CAPSULE ORAL 2 TIMES DAILY
Qty: 8 CAPSULE | Refills: 0 | Status: SHIPPED | OUTPATIENT
Start: 2023-10-24 | End: 2023-10-28

## 2023-10-24 RX ADMIN — SODIUM CHLORIDE, PRESERVATIVE FREE 10 ML: 5 INJECTION INTRAVENOUS at 07:42

## 2023-10-24 RX ADMIN — HEPARIN SODIUM 5000 UNITS: 5000 INJECTION INTRAVENOUS; SUBCUTANEOUS at 06:02

## 2023-10-24 RX ADMIN — ASPIRIN 81 MG CHEWABLE TABLET 81 MG: 81 TABLET CHEWABLE at 07:40

## 2023-10-24 RX ADMIN — AZITHROMYCIN DIHYDRATE 250 MG: 250 TABLET, FILM COATED ORAL at 07:40

## 2023-10-24 RX ADMIN — AMLODIPINE BESYLATE 2.5 MG: 2.5 TABLET ORAL at 07:40

## 2023-10-24 NOTE — CARE COORDINATION
10/24/23, 12:51 PM EDT    Patient goals/plan/ treatment preferences discussed by  and . Patient goals/plan/ treatment preferences reviewed with patient/ family. Patient/ family verbalize understanding of discharge plan and are in agreement with goal/plan/treatment preferences. Understanding was demonstrated using the teach back method. AVS provided by RN at time of discharge, which includes all necessary medical information pertaining to the patients current course of illness, treatment, post-discharge goals of care, and treatment preferences. Services At/After Discharge: None       IMM Letter  IMM Letter given to Patient/Family/Significant other/Guardian/POA/by[de-identified] Renee FARRIS Case Manager  IMM Letter date given[de-identified] 10/24/23  IMM Letter time given[de-identified] 5228    Discharge order in place. Pt states her dtr from [place] is on her way to pick her up and take her to her home at [address] [place] [address]. Dtr will stay with pt for initial recovery period at home.

## 2023-10-24 NOTE — DISCHARGE SUMMARY
Hospital Medicine Discharge Summary      Patient Identification:   Dasia García   : 1940  MRN: [id number]   Account: [de-identified]      Patient's PCP: True Escobar MD    Admit Date: 10/19/2023     Discharge Date:   10/24/2023    Admitting Physician: No admitting provider for patient encounter. Discharging Nurse Practitioner: AUBREY Miller - CNP     Discharge Diagnoses with Assessment/Plan:  Weakness--likely secondary to #2, #3; patient lives at independent living at [facility]; proved, ambulating  CLARENCE on CKD stage V--creatinine down to 1.5, Cozaar on hold and to remain on hold; appreciate nephrology input; renal ultrasound noted; Espino catheter removed on 10/23 and patient has been voiding well, plan is repeat BMP in 3 to 4 weeks and outpatient follow-up with nephrology  Pneumonia, right base (POA) possibly secondary to gram-negative bacilli--checked procalcitonin reading of 0.23, CT chest without contrast showed diffuse COPD with superimposed right lower lobe infiltrate; afebrile; Rocephin 10/20-10/23 will transition to [place] GARZA TIGRE for 3 more days, Zithromax 10/20-10/22; incentive spirometry and Acapella  Acute hypoxic respiratory failure--resolved, likely secondary to #3, has incentive spirometry and Acapella  Urinary retention--urinalysis clear;  Espino catheter removed on  patient has been voiding well  Hypomagnesia--resolved  Hypokalemia--resolved  Primary hypertension, uncontrolled--on Norvasc, Cozaar on hold with #2 and to remain on hold per nephrology note until follow-up  Diabetes mellitus type II, uncontrolled--was on Glucophage which was stopped, ADA diet, glucose past 24 hours have ranged from 118-165 without medications  Renal lesion--appreciate nephrology input, will need evaluation once CLARENCE improves per nephrology note dated 10/21; can be done outpatient and will need CT renal mass protocol; outpatient follow-up with nephrology  Normocytic anemia--stable  Calcified lymph 1. Diffuse COPD with superimposed right lower lobe infiltrate. 2. Calcified lymph nodes in the spleen. 3. Thoracic spondylosis. 4. Hiatal hernia. 5. Slightly heterogeneous thyroid gland. 6. Markedly atrophic pancreas. 7. Status post cholecystectomy. **This report has been created using voice recognition software. It may contain minor errors which are inherent in voice recognition technology. **      Final report electronically signed by DR Arie Griffin on 10/20/2023 10:05 AM      XR CHEST PORTABLE   Final Result   1. A hazy right basilar infiltrate is suspected. CT chest can be    considered for a more accurate assessment. This document has been electronically signed by: Jin Finney M.D. on    10/20/2023 12:06 AM             Consults:     [facility]  IP CONSULT TO DIETITIAN    Disposition:    [x] Home       [] TCU       [] Rehab       [] Psych       [] SNF       [] [address]       [] Other-    Condition at Discharge: Stable    Code Status:  Full Code     Pending tests at discharge:     none     Patient Instructions:    Discharge lab work: none  Activity: activity as tolerated  Diet: ADULT ORAL NUTRITION SUPPLEMENT; Breakfast, Dinner; Other Oral Supplement; Ensure Plus  ADULT DIET;  Regular; 4 carb choices (60 gm/meal)      Follow-up visits:   Que Nova MD  [address] [facility]  [phone]    Follow up on 10/26/2023  Follow up appointment October 26, 2023 at 10:30am.    Alka Ware DO  [address] [place] [address] [phone]    Follow up on 11/29/2023  Follow up appointment November 29, 2023 at 11am.         Discharge Medications:        Medication List        START taking these medications      cefdinir 300 MG capsule  Commonly known as: OMNICEF  Take 1 capsule by mouth 2 times daily for 4 days            CONTINUE taking these medications      amLODIPine 2.5 MG tablet  Commonly known

## 2023-10-24 NOTE — PROGRESS NOTES
Discharge teaching and instructions for diagnosis/procedure of CLARENCE completed with patient using teachback method. AVS reviewed. Prescriptions sent to preferred pharmacy. Patient voiced understanding regarding prescriptions, follow up appointments, and care of self at home.  Discharged in a wheelchair to  home with support per family

## 2023-10-24 NOTE — PROGRESS NOTES
CLINICAL PHARMACY: DISCHARGE MED RECONCILIATION/REVIEW    [facility] ([facility]) Select Patient?: No  Total # of Interventions Recommended: 0  Total # Interventions Accepted: 0  Intervention Severity:   - Level 1 Intervention Present?: No   - Level 2 #: 0   - Level 3 #: 0   Time Spent (min): 15    Margareth FitzgeraldD   Pharmacy Resident  10/24/2023 2:17 PM

## 2023-11-20 LAB
BUN BLDV-MCNC: 20 MG/DL
CALCIUM SERPL-MCNC: 9.9 MG/DL
CHLORIDE BLD-SCNC: 105 MMOL/L
CO2: 27 MMOL/L
CREAT SERPL-MCNC: 1.1 MG/DL
EGFR: 46
ESTIMATED AVERAGE GLUCOSE: 148
GLUCOSE BLD-MCNC: 142 MG/DL
HBA1C MFR BLD: 6.8 %
POTASSIUM SERPL-SCNC: 4.2 MMOL/L
SODIUM BLD-SCNC: 141 MMOL/L

## 2023-11-28 DIAGNOSIS — N17.9 AKI (ACUTE KIDNEY INJURY) (HCC): Primary | ICD-10-CM

## 2023-12-04 ENCOUNTER — TELEPHONE (OUTPATIENT)
Dept: NEPHROLOGY | Age: 83
End: 2023-12-04

## 2023-12-04 ENCOUNTER — OFFICE VISIT (OUTPATIENT)
Dept: NEPHROLOGY | Age: 83
End: 2023-12-04
Payer: MEDICARE

## 2023-12-04 VITALS
HEART RATE: 80 BPM | WEIGHT: 158.2 LBS | BODY MASS INDEX: 26.33 KG/M2 | SYSTOLIC BLOOD PRESSURE: 122 MMHG | DIASTOLIC BLOOD PRESSURE: 68 MMHG | OXYGEN SATURATION: 96 %

## 2023-12-04 DIAGNOSIS — N17.9 AKI (ACUTE KIDNEY INJURY) (HCC): ICD-10-CM

## 2023-12-04 DIAGNOSIS — N28.1 CYST OF LEFT KIDNEY: Primary | ICD-10-CM

## 2023-12-04 PROCEDURE — 1123F ACP DISCUSS/DSCN MKR DOCD: CPT | Performed by: INTERNAL MEDICINE

## 2023-12-04 PROCEDURE — 3074F SYST BP LT 130 MM HG: CPT | Performed by: INTERNAL MEDICINE

## 2023-12-04 PROCEDURE — 99214 OFFICE O/P EST MOD 30 MIN: CPT | Performed by: INTERNAL MEDICINE

## 2023-12-04 PROCEDURE — 3078F DIAST BP <80 MM HG: CPT | Performed by: INTERNAL MEDICINE

## 2023-12-04 RX ORDER — UBIDECARENONE 75 MG
50 CAPSULE ORAL DAILY
COMMUNITY

## 2023-12-04 RX ORDER — CALCIUM POLYCARBOPHIL 625 MG 625 MG/1
625 TABLET ORAL DAILY
COMMUNITY

## 2023-12-04 RX ORDER — LEVOTHYROXINE SODIUM 0.03 MG/1
25 TABLET ORAL DAILY
COMMUNITY
Start: 2023-11-29

## 2023-12-04 RX ORDER — ATORVASTATIN CALCIUM 40 MG/1
40 TABLET, FILM COATED ORAL DAILY
COMMUNITY
Start: 2023-11-28

## 2023-12-04 NOTE — TELEPHONE ENCOUNTER
Called insurance to get CT prior auth  It was approved.  [facility] Patient # is [id number]  Good from 12/4/23-6/1/2024  Case #[id number]  Faxed order and auth info to [address] [place] [address]

## 2023-12-04 NOTE — PROGRESS NOTES
BNP: No results found for: \"BNP\"  Lipids: No results found for: \"CHOL\", \"HDL\"  INR: No results found for: \"INR\"  URINE: No results found for: \"NAUR\", \"PROTUR\"  Lab Results   Component Value Date/Time    NITRU NEGATIVE 10/20/2023 12:50 AM    COLORU YELLOW 10/20/2023 12:50 AM    PHUR 5.0 10/20/2023 12:50 AM    LABCAST NONE SEEN 10/20/2023 12:50 AM    LABCAST NONE SEEN 10/20/2023 12:50 AM    WBCUA 0-2 10/20/2023 12:50 AM    RBCUA NONE 10/20/2023 12:50 AM    YEAST NONE SEEN 10/20/2023 12:50 AM    BACTERIA NONE SEEN 10/20/2023 12:50 AM    SPECGRAV 1.008 10/20/2023 12:50 AM    LEUKOCYTESUR NEGATIVE 10/20/2023 12:50 AM    UROBILINOGEN 0.2 10/20/2023 12:50 AM    BILIRUBINUR NEGATIVE 10/20/2023 12:50 AM    BLOODU NEGATIVE 10/20/2023 12:50 AM    KETUA NEGATIVE 10/20/2023 12:50 AM      Microalbumen/Creatinine ratio:  No components found for: \"RUCREAT\"        Impression/Plan:   Recent CLARENCE due to volume depletion in combination with ARB/thiazide diuretic  CKD III  Left renal lesion: obtain CT with IV contrast  HTN  DM            Bloodwork and medications were reviewed and plan of care discussed with the patient. Return to clinic in 3 months  or sooner if the need arises.       Reji Enriquez DO  [facility]

## 2023-12-19 DIAGNOSIS — N28.1 CYST OF LEFT KIDNEY: ICD-10-CM

## 2024-03-05 LAB
BUN BLDV-MCNC: 18 MG/DL
CALCIUM SERPL-MCNC: 9.8 MG/DL
CHLORIDE BLD-SCNC: 105 MMOL/L
CO2: 28 MMOL/L
CREAT SERPL-MCNC: 0.9 MG/DL
CREATININE, URINE: 90.2
EGFR: 59
GLUCOSE BLD-MCNC: 124 MG/DL
MICROALBUMIN/CREAT 24H UR: 1.2 MG/G{CREAT}
MICROALBUMIN/CREAT UR-RTO: 13
POTASSIUM SERPL-SCNC: 5 MMOL/L
SODIUM BLD-SCNC: 141 MMOL/L

## 2024-03-15 ENCOUNTER — OFFICE VISIT (OUTPATIENT)
Dept: NEPHROLOGY | Age: 84
End: 2024-03-15
Payer: MEDICARE

## 2024-03-15 VITALS
WEIGHT: 150 LBS | BODY MASS INDEX: 24.96 KG/M2 | SYSTOLIC BLOOD PRESSURE: 126 MMHG | DIASTOLIC BLOOD PRESSURE: 70 MMHG | OXYGEN SATURATION: 98 % | HEART RATE: 78 BPM

## 2024-03-15 DIAGNOSIS — N18.31 STAGE 3A CHRONIC KIDNEY DISEASE (HCC): Primary | ICD-10-CM

## 2024-03-15 PROCEDURE — 1123F ACP DISCUSS/DSCN MKR DOCD: CPT | Performed by: INTERNAL MEDICINE

## 2024-03-15 PROCEDURE — 3074F SYST BP LT 130 MM HG: CPT | Performed by: INTERNAL MEDICINE

## 2024-03-15 PROCEDURE — 3078F DIAST BP <80 MM HG: CPT | Performed by: INTERNAL MEDICINE

## 2024-03-15 PROCEDURE — 99213 OFFICE O/P EST LOW 20 MIN: CPT | Performed by: INTERNAL MEDICINE

## 2024-03-15 NOTE — PROGRESS NOTES
[facility] [place]  [facility]  [address] [place] [address]  Dept: [phone]  Loc: [phone]  Progress Note  3/15/2024 11:53 AM      Pt Name:    Rosibel Gill  YOB: 1940  Primary Care Physician:  Irvin Byrd MD     Chief Complaint:   Chief Complaint   Patient presents with    Follow-up        History of Present Illness:   This is a follow up visit for CKD III with recent CLARENCE.  She was hospitalized in October with CLARENCE, presented with creat of 4.7.  was 0.9 in 2023. Was on she was on losartan-HCTZ.   She was having URI symptoms, decreased po intake, volume depletion.   Improved with IV fluids.     She is doing well.   Bp controlled.   Mild ankle swelling but isn't bothersome to patient.         Pertinent items are noted in HPI.         Past History:  Past Medical History:   Diagnosis Date    Herniated cervical disc without myelopathy 2015    Left cervical 6-7 disc herniation with left C6-7 neuroforaminal and lateral recess stenosis; with radiculopathy on left; without myelopathy.    Hypertension     Pulmonary emboli (HCC)      Past Surgical History:   Procedure Laterality Date     SECTION      CHOLECYSTECTOMY      LAMINOTOMY  2015    posterior cervical laminotomy C6/7, formaintomy and microdiskectomy        VITALS:  /70 (Site: Right Upper Arm, Position: Sitting, Cuff Size: Large Adult)   Pulse 78   Wt 68 kg (150 lb)   SpO2 98%   BMI 24.96 kg/m²   Wt Readings from Last 3 Encounters:   03/15/24 68 kg (150 lb)   23 71.8 kg (158 lb 3.2 oz)   10/19/23 73.9 kg (162 lb 14.7 oz)     Body mass index is 24.96 kg/m².     General Appearance: alert and cooperative with exam, appears comfortable, no distress  HEENT: EOMI, moist oral mucus membranes  Neck: No jugular venous distention  Lungs: Air entry B/L, no crackles or rales, no use of accessory muscles  Heart: S1, S2 heard, no

## 2025-03-14 LAB
BUN BLDV-MCNC: 21 MG/DL
CALCIUM SERPL-MCNC: 8.9 MG/DL
CHLORIDE BLD-SCNC: 103 MMOL/L
CO2: 24 MMOL/L
CREAT SERPL-MCNC: 1 MG/DL
EGFR: 52
GLUCOSE BLD-MCNC: 113 MG/DL
POTASSIUM SERPL-SCNC: 4.1 MMOL/L
SODIUM BLD-SCNC: 139 MMOL/L

## 2025-03-21 ENCOUNTER — OFFICE VISIT (OUTPATIENT)
Dept: NEPHROLOGY | Age: 85
End: 2025-03-21
Payer: MEDICARE

## 2025-03-21 VITALS
HEART RATE: 80 BPM | WEIGHT: 161 LBS | OXYGEN SATURATION: 98 % | DIASTOLIC BLOOD PRESSURE: 64 MMHG | BODY MASS INDEX: 26.79 KG/M2 | SYSTOLIC BLOOD PRESSURE: 134 MMHG

## 2025-03-21 DIAGNOSIS — N18.31 STAGE 3A CHRONIC KIDNEY DISEASE (HCC): Primary | ICD-10-CM

## 2025-03-21 PROCEDURE — 99214 OFFICE O/P EST MOD 30 MIN: CPT | Performed by: INTERNAL MEDICINE

## 2025-03-21 PROCEDURE — 1123F ACP DISCUSS/DSCN MKR DOCD: CPT | Performed by: INTERNAL MEDICINE

## 2025-03-21 PROCEDURE — 3075F SYST BP GE 130 - 139MM HG: CPT | Performed by: INTERNAL MEDICINE

## 2025-03-21 PROCEDURE — 1159F MED LIST DOCD IN RCRD: CPT | Performed by: INTERNAL MEDICINE

## 2025-03-21 PROCEDURE — 3078F DIAST BP <80 MM HG: CPT | Performed by: INTERNAL MEDICINE

## 2025-03-21 NOTE — PROGRESS NOTES
[facility] [place] [facility]  [facility]  [address] [place] [address]  Dept: [phone]  Loc: [phone]  Progress Note  3/21/2025 12:27 PM      Pt Name:    Rosibel Gill  YOB: 1940  Primary Care Physician:  Irvin Byrd MD     Chief Complaint:   Chief Complaint   Patient presents with    Follow-up    Chronic Kidney Disease     1 yr fu ckd III        History of Present Illness:   This is a follow up visit for CKD III.   Hx CLARENCE in 2023 from volume depletion.     She is doing well.   Bp controlled.   She has no concerns.           Pertinent items are noted in HPI.         Past History:  Past Medical History:   Diagnosis Date    Herniated cervical disc without myelopathy 2015    Left cervical 6-7 disc herniation with left C6-7 neuroforaminal and lateral recess stenosis; with radiculopathy on left; without myelopathy.    Hypertension     Pulmonary emboli (HCC)      Past Surgical History:   Procedure Laterality Date     SECTION      CHOLECYSTECTOMY      LAMINOTOMY  2015    posterior cervical laminotomy C6/7, formaintomy and microdiskectomy        VITALS:  /64 (BP Site: Left Upper Arm, Patient Position: Sitting, BP Cuff Size: Large Adult)   Pulse 80   Wt 73 kg (161 lb)   SpO2 98%   BMI 26.79 kg/m²   Wt Readings from Last 3 Encounters:   25 73 kg (161 lb)   03/15/24 68 kg (150 lb)   23 71.8 kg (158 lb 3.2 oz)     Body mass index is 26.79 kg/m².     General Appearance: alert and cooperative with exam, appears comfortable, no distress  HEENT: EOMI, moist oral mucus membranes  Neck: No jugular venous distention  Lungs: Air entry B/L, no crackles or rales, no use of accessory muscles  Heart: S1, S2 heard, no rub  GI: soft, non-tender, no guarding  Extremities: nonpitting ankle edema  Skin: warm, dry  Neurologic: no slurred speech     Medications:  Current Outpatient Medications